# Patient Record
Sex: FEMALE | Race: WHITE | NOT HISPANIC OR LATINO | Employment: PART TIME | URBAN - METROPOLITAN AREA
[De-identification: names, ages, dates, MRNs, and addresses within clinical notes are randomized per-mention and may not be internally consistent; named-entity substitution may affect disease eponyms.]

---

## 2018-01-31 ENCOUNTER — HOSPITAL ENCOUNTER (OUTPATIENT)
Facility: MEDICAL CENTER | Age: 18
End: 2018-01-31
Attending: OBSTETRICS & GYNECOLOGY
Payer: MEDICAID

## 2018-01-31 ENCOUNTER — GYNECOLOGY VISIT (OUTPATIENT)
Dept: OBGYN | Facility: CLINIC | Age: 18
End: 2018-01-31
Payer: MEDICAID

## 2018-01-31 VITALS
HEIGHT: 64 IN | DIASTOLIC BLOOD PRESSURE: 58 MMHG | WEIGHT: 121 LBS | SYSTOLIC BLOOD PRESSURE: 100 MMHG | BODY MASS INDEX: 20.66 KG/M2

## 2018-01-31 DIAGNOSIS — Z01.419 WELL WOMAN EXAM: ICD-10-CM

## 2018-01-31 DIAGNOSIS — Z11.3 SCREEN FOR SEXUALLY TRANSMITTED DISEASES: ICD-10-CM

## 2018-01-31 LAB
C TRACH DNA SPEC QL NAA+PROBE: NEGATIVE
N GONORRHOEA DNA SPEC QL NAA+PROBE: NEGATIVE
SPECIMEN SOURCE: NORMAL

## 2018-01-31 PROCEDURE — 87491 CHLMYD TRACH DNA AMP PROBE: CPT

## 2018-01-31 PROCEDURE — 99395 PREV VISIT EST AGE 18-39: CPT | Mod: EP | Performed by: OBSTETRICS & GYNECOLOGY

## 2018-01-31 PROCEDURE — 87591 N.GONORRHOEAE DNA AMP PROB: CPT

## 2018-01-31 RX ORDER — DESOGESTREL AND ETHINYL ESTRADIOL 0.15-0.03
1 KIT ORAL DAILY
Qty: 28 TAB | Refills: 12 | Status: SHIPPED | OUTPATIENT
Start: 2018-01-31 | End: 2019-02-08 | Stop reason: SDUPTHER

## 2019-02-05 ENCOUNTER — TELEPHONE (OUTPATIENT)
Dept: OBGYN | Facility: CLINIC | Age: 19
End: 2019-02-05

## 2019-02-08 ENCOUNTER — GYNECOLOGY VISIT (OUTPATIENT)
Dept: OBGYN | Facility: CLINIC | Age: 19
End: 2019-02-08
Payer: COMMERCIAL

## 2019-02-08 VITALS — BODY MASS INDEX: 22.64 KG/M2 | WEIGHT: 132 LBS | DIASTOLIC BLOOD PRESSURE: 70 MMHG | SYSTOLIC BLOOD PRESSURE: 120 MMHG

## 2019-02-08 DIAGNOSIS — Z30.011 ENCOUNTER FOR INITIAL PRESCRIPTION OF CONTRACEPTIVE PILLS: ICD-10-CM

## 2019-02-08 PROCEDURE — 99213 OFFICE O/P EST LOW 20 MIN: CPT | Performed by: ADVANCED PRACTICE MIDWIFE

## 2019-02-08 RX ORDER — DESOGESTREL AND ETHINYL ESTRADIOL 0.15-0.03
1 KIT ORAL DAILY
Qty: 28 TAB | Refills: 6 | Status: SHIPPED | OUTPATIENT
Start: 2019-02-08 | End: 2019-10-23 | Stop reason: SDUPTHER

## 2019-02-08 NOTE — TELEPHONE ENCOUNTER
I called pt to notify her that she has enough refills on her BCP,  Has an appt today and will ask for new Rx since Dr. Quach is not longer w/us.  agreed to plan.

## 2019-02-08 NOTE — NON-PROVIDER
Pt here for annual exam  Pt states needs refill on BC (Apri)  Good# [phone]  Pharmacy verified

## 2019-02-08 NOTE — PROGRESS NOTES
Bea Garcia is a 18 y.o. female who presents for reinitiation of birth control pills.        HPI Comments: Pt presents for contraceptive counseling.  No LMP recorded. Patient reports that she was doing well on her Apri birth control pills but after contacting the pharmacy, she was unable to refill until her visit. Thus, she stopped birth control pills 2 weeks ago. She did have unprotected intercourse last evening and used withdrawal method. She would like to restart pills.    Review of Systems   Pertinent positives documented in HPI and all other systems reviewed & are negative      Past Medical History:   Diagnosis Date   • Healthy infant or child    • Pyelonephritis 2016   • Scoliosis        Medications:   Current Outpatient Prescriptions Ordered in [facility]   Medication Sig Dispense Refill   • desogestrel-ethinyl estradiol (APRI) 0.15-30 MG-MCG per tablet Take 1 Tab by mouth every day. 28 Tab 6   • acetaminophen (TYLENOL) 325 MG Tab Take 2 Tabs by mouth every 6 hours as needed (Mild Pain; (Pain scale 1-3); Temp greater than 100.5 F). 30 Tab 2     No current Epic-ordered facility-administered medications on file.           Objective:   Vital measurements:  Blood pressure 120/70, weight 59.9 kg (132 lb), not currently breastfeeding.  Body mass index is 22.64 kg/m². (Goal BM I>18 <25)    Physical Exam   Nursing note and vitals reviewed.  Constitutional: She is oriented to person, place, and time. She appears well-developed and well-nourished. No distress.     Musculoskeletal: Normal range of motion. She exhibits no edema and no tenderness.     Skin: Skin is warm and dry. No rash noted. She is not diaphoretic. No erythema. No pallor.     Psychiatric: She has a normal mood and affect. Her behavior is normal. Judgment and thought content normal.               Assessment:   1. Encounter for initial prescription of contraceptive pills      Plan:   1. Discussed restarting birth control with patient. She will wait until  first day of menses and start pills at that time. We discussed what to do if she misses one or more pills. She is aware that birth control does not protect against STIs and that she should use a condom with every act of intercourse. ACHES reviewed and patient voices understanding. All questions answered.

## 2019-08-26 ENCOUNTER — TELEPHONE (OUTPATIENT)
Dept: OBGYN | Facility: CLINIC | Age: 19
End: 2019-08-26

## 2019-08-26 NOTE — TELEPHONE ENCOUNTER
Pt called requesting 1 refill for her BC pills. Las seen was 2/19. I called pharmacy to approve 1 more refill per pt request.

## 2019-10-31 RX ORDER — DESOGESTREL AND ETHINYL ESTRADIOL 0.15-0.03
KIT ORAL
Qty: 84 TAB | Refills: 2 | Status: SHIPPED | OUTPATIENT
Start: 2019-10-31 | End: 2020-12-07

## 2020-12-07 RX ORDER — DESOGESTREL AND ETHINYL ESTRADIOL 0.15-0.03
KIT ORAL
Qty: 84 TAB | Refills: 3 | Status: SHIPPED | OUTPATIENT
Start: 2020-12-07